# Patient Record
Sex: FEMALE | Race: BLACK OR AFRICAN AMERICAN | NOT HISPANIC OR LATINO | Employment: UNEMPLOYED | URBAN - METROPOLITAN AREA
[De-identification: names, ages, dates, MRNs, and addresses within clinical notes are randomized per-mention and may not be internally consistent; named-entity substitution may affect disease eponyms.]

---

## 2025-03-15 ENCOUNTER — HOSPITAL ENCOUNTER (EMERGENCY)
Facility: HOSPITAL | Age: 25
Discharge: HOME | End: 2025-03-15
Attending: EMERGENCY MEDICINE
Payer: COMMERCIAL

## 2025-03-15 VITALS
HEART RATE: 92 BPM | WEIGHT: 134 LBS | OXYGEN SATURATION: 97 % | SYSTOLIC BLOOD PRESSURE: 116 MMHG | TEMPERATURE: 98.6 F | DIASTOLIC BLOOD PRESSURE: 76 MMHG | RESPIRATION RATE: 16 BRPM | BODY MASS INDEX: 26.31 KG/M2 | HEIGHT: 60 IN

## 2025-03-15 DIAGNOSIS — L91.8 SKIN TAG: ICD-10-CM

## 2025-03-15 DIAGNOSIS — Z13.9 ENCOUNTER FOR MEDICAL SCREENING EXAMINATION: ICD-10-CM

## 2025-03-15 DIAGNOSIS — L24.81 IRRITANT CONTACT DERMATITIS DUE TO METALS: Primary | ICD-10-CM

## 2025-03-15 PROCEDURE — 99283 EMERGENCY DEPT VISIT LOW MDM: CPT

## 2025-03-15 PROCEDURE — 99284 EMERGENCY DEPT VISIT MOD MDM: CPT | Performed by: EMERGENCY MEDICINE

## 2025-03-15 PROCEDURE — 99282 EMERGENCY DEPT VISIT SF MDM: CPT | Performed by: EMERGENCY MEDICINE

## 2025-03-15 PROCEDURE — 2500000001 HC RX 250 WO HCPCS SELF ADMINISTERED DRUGS (ALT 637 FOR MEDICARE OP)

## 2025-03-15 RX ORDER — HYDROXYZINE HYDROCHLORIDE 25 MG/1
25 TABLET, FILM COATED ORAL ONCE
Status: COMPLETED | OUTPATIENT
Start: 2025-03-15 | End: 2025-03-15

## 2025-03-15 RX ORDER — MAG HYDROX/ALUMINUM HYD/SIMETH 200-200-20
1 SUSPENSION, ORAL (FINAL DOSE FORM) ORAL 2 TIMES DAILY PRN
Qty: 15 G | Refills: 0 | Status: SHIPPED | OUTPATIENT
Start: 2025-03-15

## 2025-03-15 RX ADMIN — HYDROXYZINE HYDROCHLORIDE 25 MG: 25 TABLET, FILM COATED ORAL at 17:52

## 2025-03-15 ASSESSMENT — COLUMBIA-SUICIDE SEVERITY RATING SCALE - C-SSRS
6. HAVE YOU EVER DONE ANYTHING, STARTED TO DO ANYTHING, OR PREPARED TO DO ANYTHING TO END YOUR LIFE?: NO
2. HAVE YOU ACTUALLY HAD ANY THOUGHTS OF KILLING YOURSELF?: NO
1. IN THE PAST MONTH, HAVE YOU WISHED YOU WERE DEAD OR WISHED YOU COULD GO TO SLEEP AND NOT WAKE UP?: NO

## 2025-03-15 ASSESSMENT — PAIN - FUNCTIONAL ASSESSMENT: PAIN_FUNCTIONAL_ASSESSMENT: 0-10

## 2025-03-15 ASSESSMENT — PAIN SCALES - GENERAL: PAINLEVEL_OUTOF10: 0 - NO PAIN

## 2025-03-15 NOTE — ED TRIAGE NOTES
"Pt states intermittent rash to skin \"since baby\" Pt also requesting \"check up\" Not STI, just \"a regular check up\"  "

## 2025-03-15 NOTE — DISCHARGE INSTRUCTIONS
Please follow-up with a primary care doctor.  I have sent a prescription for topical cream that you may use on your rash.  Return to the emergency department for any new or worsening symptoms or any other concerns.    --   [facility]  Phone: [phone]  Address: [place] [address]    --    Dermatology Clinic  Phone: [phone]

## 2025-03-15 NOTE — ED PROVIDER NOTES
Emergency Department Provider Note        History of Present Illness     CC: Rash     HPI:  This is a 25-year-old female who presents to the emergency department for evaluation of an itchy rash around her neck as well as for a general checkup.  She states that the rash on her neck has appeared over the course of the past few days.  States anytime she wears specific truly this happens.  She has taken the jewelry off however the rash continues to be itchy.  It has started to go down a little bit however is still there and bothersome.  She denies a rash in any other part of her body.  Sometimes she gets a rash near her ears however does not currently have one.  Denies any fevers, chills, other infectious type symptoms.  Denies any exposure to any other new detergents or products.  She states that she intermittently gets a headache.  But denies any dizziness, lightheadedness.  Denies any cough, congestion, runny nose.  Denies any neck pain.  Denies any chest pain or shortness of breath.  Denies any abdominal pain nausea or vomiting.  Has been eating and drinking well.  Denies any urinary symptoms including dysuria, frequency, urgency.  Denies any vaginal bleeding or vaginal discharge outside of her normal menstrual periods.  Has been able to ambulate well without any abnormalities in her arms or legs.  No back pain at all.    Limitations to history: None  Independent historian(s): None   Records Reviewed: Recent available ED and inpatient notes reviewed in EMR.    PMHx/PSHx:  Per HPI.   - has no past medical history on file.  - has no past surgical history on file.    Medications:  Reviewed in EMR. See EMR for complete list of medications and doses.    Allergies:  Patient has no known allergies.    Social History:  - Tobacco:  has no history on file for tobacco use.   - Alcohol:  has no history on file for alcohol use.   - Illicit Drugs:  has no history on file for drug use.     ROS:  Per HPI.       Physical Exam      Triage Vitals:  T 37 °C (98.6 °F)  HR 92  /76  RR 16  O2 97 %      General: Patient resting comfortably, no acute distress, overall well appearing, and appropriately conversational.   Head: Normocephalic. Atraumatic.  Neck: FROM. No gross masses.  There is a small superficial raised and slightly erythematous rash around the base of the patient's neck most significant on the right anterior portion.  Eyes: EOMI. Conjunctiva clear.   ENT: Moist mucous membranes, no apparent trauma or lesions.  CV: Regular rate. 2+ radial pulses bilaterally.  Normal S1 and S2 with no murmurs appreciated.  Resp: No respiratory distress, breathing easily. Speaks in full sentences.    GI: Soft, non-distended. No tenderness with palpation.   MSK: Full ROM in bilateral upper and lower extremities. No gross step offs or deformities.  EXT: No peripheral edema, contusions, or wounds.   Skin: Warm and dry, no rashes or lesions.  Neuro: Alert. No focal neurological deficits. Motor and sensation intact throughout. Speech fluent. Normal gait.   Psych: Appropriate mood and behavior, converses and responds appropriately.      Medical Decision Making & ED Course     Labs:   Labs Reviewed - No data to display     Imaging:   No orders to display        EKG:  None    MDM:  This is a 25-year-old female presents to the emergency department for evaluation of a rash and for general checkup.  She is hemodynamically stable and in no distress.  Vital signs are within normal limits.  Her physical exam has a small, erythematous and raged papular rash around the base of her neck.  This is most consistent with a contact dermatitis given that has appeared with the jewelry and has started to go away after she has removed it.  Also considered eczema which the patient does have a history of.  There is no sloughing, negative Nikolsky sign, no concern for a serious life-threatening rash.  No fevers, chills, other infectious type symptoms to indicate staph  scalded skin or toxic shock syndrome.  She is given Atarax for her itch today.  Will treat her with a prescription for hydrocortisone topical cream.  She is recommended to continue with Tylenol and ibuprofen as needed for any intermittent headaches that she may have.  She is recommended to follow-up with a primary care doctor and I have ordered a referral for 1 as well.  Will provide dermatology number as well for follow-up.  At this time she is appropriate for discharge home.  She expresses understanding and agrees with the plan.  She remained stable and is discharged.      ED Course:  Diagnoses as of 03/15/25 1750   Irritant contact dermatitis due to metals   Encounter for medical screening examination   Skin tag       Independent Result Review and Interpretation: Relevant laboratory and radiographic results were reviewed and independently interpreted by myself.  As necessary, they are commented on in the ED Course.    Social Determinants Limiting Care:  None identified      Patient seen by and discussed with the attending emergency medicine physician.       Disposition    Discharge    Ricardo Ram DO   Emergency Medicine PGY-3  [facility]      Procedures      Procedures ? Smart[employer] last updated 3/15/2025 5:50 PM          Ricardo Ram DO  Resident  03/15/25 1750

## 2025-06-20 ENCOUNTER — OFFICE VISIT (OUTPATIENT)
Dept: OBSTETRICS AND GYNECOLOGY | Facility: CLINIC | Age: 25
End: 2025-06-20
Payer: COMMERCIAL

## 2025-06-20 VITALS
BODY MASS INDEX: 26.25 KG/M2 | DIASTOLIC BLOOD PRESSURE: 70 MMHG | WEIGHT: 134.4 LBS | HEART RATE: 88 BPM | SYSTOLIC BLOOD PRESSURE: 106 MMHG

## 2025-06-20 DIAGNOSIS — Z11.3 SCREENING FOR STD (SEXUALLY TRANSMITTED DISEASE): ICD-10-CM

## 2025-06-20 DIAGNOSIS — L24.81 IRRITANT CONTACT DERMATITIS DUE TO METALS: ICD-10-CM

## 2025-06-20 DIAGNOSIS — Z32.02 PREGNANCY TEST NEGATIVE: Primary | ICD-10-CM

## 2025-06-20 DIAGNOSIS — Z12.4 CERVICAL CANCER SCREENING: ICD-10-CM

## 2025-06-20 DIAGNOSIS — Z01.419 WELL WOMAN EXAM WITH ROUTINE GYNECOLOGICAL EXAM: ICD-10-CM

## 2025-06-20 LAB — PREGNANCY TEST URINE, POC: NEGATIVE

## 2025-06-20 PROCEDURE — 99385 PREV VISIT NEW AGE 18-39: CPT

## 2025-06-20 PROCEDURE — 81025 URINE PREGNANCY TEST: CPT

## 2025-06-20 PROCEDURE — 1036F TOBACCO NON-USER: CPT

## 2025-06-20 RX ORDER — MAG HYDROX/ALUMINUM HYD/SIMETH 200-200-20
1 SUSPENSION, ORAL (FINAL DOSE FORM) ORAL 2 TIMES DAILY PRN
Qty: 15 G | Refills: 0 | Status: SHIPPED | OUTPATIENT
Start: 2025-06-20

## 2025-06-20 ASSESSMENT — ENCOUNTER SYMPTOMS
GASTROINTESTINAL NEGATIVE: 0
ALLERGIC/IMMUNOLOGIC NEGATIVE: 0
RESPIRATORY NEGATIVE: 0
PSYCHIATRIC NEGATIVE: 0
HEMATOLOGIC/LYMPHATIC NEGATIVE: 0
CONSTITUTIONAL NEGATIVE: 0
EYES NEGATIVE: 0
MUSCULOSKELETAL NEGATIVE: 0
CARDIOVASCULAR NEGATIVE: 0
NEUROLOGICAL NEGATIVE: 0
ENDOCRINE NEGATIVE: 0

## 2025-06-20 ASSESSMENT — PATIENT HEALTH QUESTIONNAIRE - PHQ9
1. LITTLE INTEREST OR PLEASURE IN DOING THINGS: NOT AT ALL
2. FEELING DOWN, DEPRESSED OR HOPELESS: NOT AT ALL
SUM OF ALL RESPONSES TO PHQ9 QUESTIONS 1 AND 2: 0

## 2025-06-20 ASSESSMENT — PAIN SCALES - GENERAL: PAINLEVEL_OUTOF10: 0-NO PAIN

## 2025-06-20 NOTE — PROGRESS NOTES
Sidney Davis is a 25 y.o. female who is here for Annual Exam (Patient is here for an annual /No pain /No falls /LMP 2025/STD Blood and urine ).    Periods are regular but now patient reports that she is spotting a little after  No other symptoms    Sexual Activity: sexually active, male and female partners; Patient reports 2 partners in the last 12 months.  Pain with intercourse? no    History of prior STI: chlamydia  Desires STI screening? Yes    Current contraception: none    Family history of uterine or ovarian cancer: no  Family history of breast cancer: yes - grandmother with history of breast cancer      OB History    Para Term  AB Living   0 0 0 0 0 0   SAB IAB Ectopic Multiple Live Births   0 0 0 0 0      Objective   /70   Pulse 88   Wt 61 kg (134 lb 6.4 oz)   LMP  (LMP Unknown)      General:   Alert and oriented x 3   Heart:  Lungs: Regular rate, rhythm  Clear to auscultation bilaterally   Thyroid: Euthyroid, normal shape and size   Breast: Symmetrical, no skin changes/nipple discharge, redness, tenderness, no masses palpated bilaterally   Abdomen: Soft, non tender   Vulva: EGBUS normal   Vagina: Pink, normal discharge   Cervix: No CMT   Uterus: Normal shape, size   Adnexa: NT bilaterally         Assessment/Plan   Diagnoses and all orders for this visit:  Well woman exam with routine gynecological exam  Cervical cancer screening  -     THINPREP PAP TEST  Screening for STD (sexually transmitted disease)  -     Hepatitis B Surface Antigen; Future  -     Hepatitis C Antibody; Future  -     HIV 1/2 Antigen/Antibody Screen with Reflex to Confirmation; Future  -     Syphilis Screen with Reflex; Future  Irritant contact dermatitis due to metals  -     hydrocortisone 1 % ointment; Apply 1 Application topically 2 times a day as needed for irritation or rash.    All patient questions answered.   Encouraged to reach out to our office with any questions or concerns.   Encouraged  patient to follow up annually and PRN    Reema Wallace, APRN-CNM

## 2025-06-21 LAB
HBV SURFACE AG SERPL QL IA: NORMAL
HCV AB SERPL QL IA: NORMAL
HIV 1+2 AB+HIV1 P24 AG SERPL QL IA: NORMAL
T PALLIDUM AB SER QL IA: NORMAL

## 2025-06-23 LAB
C TRACH RRNA SPEC QL NAA+PROBE: NEGATIVE
N GONORRHOEA DNA SPEC QL PROBE+SIG AMP: NEGATIVE
T VAGINALIS RRNA SPEC QL NAA+PROBE: NEGATIVE

## 2025-06-24 LAB
HBV SURFACE AG SERPL QL IA: NORMAL
HCV AB SERPL QL IA: NORMAL
HIV 1+2 AB+HIV1 P24 AG SERPL QL IA: NORMAL
T PALLIDUM AB SER QL IA: NEGATIVE

## 2025-07-14 LAB
CYTOLOGY CMNT CVX/VAG CYTO-IMP: NORMAL
LAB AP HPV HR: NORMAL
LAB AP PAP ADDITIONAL TESTS: NORMAL
LABORATORY COMMENT REPORT: NORMAL
PATH REPORT.TOTAL CANCER: NORMAL